# Patient Record
Sex: FEMALE | Race: WHITE
[De-identification: names, ages, dates, MRNs, and addresses within clinical notes are randomized per-mention and may not be internally consistent; named-entity substitution may affect disease eponyms.]

---

## 2022-10-21 PROBLEM — Z00.00 ENCOUNTER FOR PREVENTIVE HEALTH EXAMINATION: Status: ACTIVE | Noted: 2022-10-21

## 2022-10-31 RX ORDER — NORETHINDRONE ACETATE AND ETHINYL ESTRADIOL AND FERROUS FUMARATE 1MG-20(21)
1-20 KIT ORAL
Qty: 112 | Refills: 0 | Status: ACTIVE | COMMUNITY
Start: 2022-06-29

## 2022-10-31 RX ORDER — CYCLOBENZAPRINE HYDROCHLORIDE 5 MG/1
5 TABLET, FILM COATED ORAL
Qty: 60 | Refills: 0 | Status: ACTIVE | COMMUNITY
Start: 2022-04-01

## 2022-11-01 ENCOUNTER — APPOINTMENT (OUTPATIENT)
Dept: COLORECTAL SURGERY | Facility: CLINIC | Age: 22
End: 2022-11-01

## 2022-11-01 VITALS
OXYGEN SATURATION: 97 % | SYSTOLIC BLOOD PRESSURE: 143 MMHG | WEIGHT: 205 LBS | RESPIRATION RATE: 16 BRPM | BODY MASS INDEX: 27.77 KG/M2 | TEMPERATURE: 98.6 F | HEART RATE: 79 BPM | DIASTOLIC BLOOD PRESSURE: 82 MMHG | HEIGHT: 72 IN

## 2022-11-01 PROCEDURE — 46600 DIAGNOSTIC ANOSCOPY SPX: CPT

## 2022-11-01 PROCEDURE — 99204 OFFICE O/P NEW MOD 45 MIN: CPT | Mod: 25

## 2022-11-01 NOTE — ASSESSMENT
[FreeTextEntry1] : Exam findings and diagnosis were discussed at length with patient. \par Recommendations including increased fiber intake, adequate daily hydration, stool softeners as needed, and sitz baths as needed and after bowel movements were discussed.\par Medical management was discussed. She has been compliant with topical diltiazem compound.\par \par Concern for chronic fissure discussed and role of surgical intervention for chronic fissures refractory to medical management was discussed.\par Surgical options were discussed, including exam under anesthesia, fissurectomy and excision of anal tag, botox injection vs lateral partial internal sphincterotomy. Risk/benefits/alternatives discussed at length, including but not limited to pain, bleeding, recurrence of symptoms, need for future surgery, and risk of alteration of bowel habits, such as seepage, fecal urgency and/or fecal (gas, liquid or solid) incontinence discussed, which may be temporary or permanent.\par \par Recommend patient consider fissurectomy with botox injection.\par She declines further intervention at this time and requests alternative medical management.\par Prescription for nifedipine compound (she requests mail order) written for patient.\par Recommend reassessment in 1 month, or sooner as needed.\par \par All questions answered, patient expressed understanding and is agreeable to this plan.\par

## 2022-11-01 NOTE — PHYSICAL EXAM
[FreeTextEntry1] : Medical assistant present for duration of physical examination\par \par General no acute distress, alert and oriented\par Psych calm, pleasant demeanor, responding appropriately to questions\par Nonlabored breathing\par Ambulating without assistance\par Skin normal color and pigment, no visible lesions or rashes\par \par Anorectal Exam:\par Inspection no erythema, induration or fluctuance, no skin excoriation, anterior midline anal fissure with associated tag, posterior midline anal fissure\par RADHA mild tenderness, no masses palpated, no blood on gloved finger\par \par Procedure: Anoscopy\par \par Pre procedure Diagnosis: anal fissures\par Post procedure Diagnosis: anal fissures\par Anesthesia: none\par Estimated blood loss: none\par Specimen: none\par Complications: none\par \par Consent obtained. Anoscopy was performed by passing a lighted anoscope with lubricant jelly into the anal canal and the entire anal mucosal surface was inspected. Findings included anterior and posterior midline anal fissures with hypertrophied anal papilla anterior midline, mild internal hemorrhoids\par \par Patient tolerated examination and procedure well.\par \par \par

## 2022-11-01 NOTE — HISTORY OF PRESENT ILLNESS
[FreeTextEntry1] : 21 yo F presents for evaluation of anal fissure\par \par Denies PMH, prior smoker of vaping products\par Meds - Junel Fe birth control, cyclobenzaprine for TMJ\par \par PSH wisdom teeth. Not under anesthesia for procedure. \par Denies h/o pregnancy\par \par Never had a colonoscopy\par Denies FMH CRC or IBD\par \par c/o of anal fissure since June 2022 \par Pain with BMs, BRB on TP\par Pain previously only during BM, now lasting for about 30mins to 1hr after BM in past week. \par Seen by [facility] Dietrich early September at referral of PCP and started on diltiazem compound.\par She is using BID. \par BM 1-2/day. Denies diarrhea or constipation. Denies pushing or straining.\par Took miralax for a week after seeing Dr Dietrich, but did not make a difference and caused stomach aches so she stopped. Otherwise denies stool softeners or fiber supplement. \par \par Sexually active, denies anal intercourse. \par \par No ASA/NSAIDs in last 7 days

## 2022-11-29 ENCOUNTER — APPOINTMENT (OUTPATIENT)
Dept: COLORECTAL SURGERY | Facility: CLINIC | Age: 22
End: 2022-11-29

## 2022-11-29 VITALS
SYSTOLIC BLOOD PRESSURE: 136 MMHG | HEART RATE: 85 BPM | HEIGHT: 72 IN | BODY MASS INDEX: 27.77 KG/M2 | DIASTOLIC BLOOD PRESSURE: 86 MMHG | WEIGHT: 205 LBS | TEMPERATURE: 97.9 F

## 2022-11-29 PROCEDURE — 46600 DIAGNOSTIC ANOSCOPY SPX: CPT

## 2022-11-29 NOTE — HISTORY OF PRESENT ILLNESS
[FreeTextEntry1] : 23 yo F presents for f/u anal fissure\par \par Seen for initial consultation 11/1/22 c/o fissure since June 2022. 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 Pt opted to continue medical management w/ alternative compound. Prescription for topical nifedipine given.\par \par Pt presents for one month follow up\par Started the topical nifedipine, some improvement in first 2 weeks, then back to same level pain as prior.\par She feels her symptoms have plateaued. Pain with BM, blood on TP, pain lasted for 2-3 hrs after BMs last week. \par Daily BM, once per still. Took stool softeners for 1 week but did not noticed any difference in BM. Denies diarrhea or constipation.\par \par Never had a colonoscopy\par Denies FMH CRC or IBD\par \par Since last visit, started taking Baclofen for TMJ. Last took 3 days ago.

## 2022-11-29 NOTE — PHYSICAL EXAM
[FreeTextEntry1] : Medical assistant present for duration of physical examination\par \par General no acute distress, alert and oriented\par Psych calm, pleasant, in good spirits\par Nonlabored breathing\par Ambulating without assistance\par Skin no visible skin changes\par \par Anorectal Exam:\par Inspection no cellulitis, anterior midline anal fissure with associated tag, posterior midline anal fissure with small tag\par RADHA mild tenderness, no masses palpated, no blood on gloved finger\par \par Procedure: Anoscopy\par \par Pre procedure Diagnosis: anal fissures\par Post procedure Diagnosis: anal fissures\par Anesthesia: none\par Estimated blood loss: none\par Specimen: none\par Complications: none\par \par Consent obtained. Anoscopy was performed by passing a lighted anoscope with lubricant jelly into the anal canal and the entire anal mucosal surface was inspected. Findings included anterior and posterior midline anal fissures with hypertrophied anal papilla anterior midline, mild internal hemorrhoids\par \par Patient tolerated examination and procedure well.\par \par \par

## 2022-11-29 NOTE — ASSESSMENT
[FreeTextEntry1] : Symptoms have persisted and clinical exam unchanged despite medical management.\par Patient is agreeable to alternative treatment/intervention.\par Options discussed, recommend fissurectomy with botox injection.\par The nature of the procedure as well as risks and benefits were reviewed with the patient. Risk/benefits/alternatives discussed at length, including but not limited to pain, bleeding, infection, swelling, recurrence of symptoms, need for future surgery, scarring, and risk of alteration of bowel habits, such as seepage, fecal urgency and/or fecal (gas, liquid or solid) incontinence discussed, which may be temporary or permanent. Postprocedural expectations regarding pain, wound cosmesis, and wound healing was discussed.  The preoperative, intraoperative, and postoperative management were discussed with the patient in detail. All questions were answered, patient expressed understanding, and would like to proceed with the proposed surgery. Informed consent was obtained. Ambulatory surgery instructions were given to patient\par

## 2022-12-13 ENCOUNTER — TRANSCRIPTION ENCOUNTER (OUTPATIENT)
Age: 22
End: 2022-12-13

## 2023-01-03 ENCOUNTER — LABORATORY RESULT (OUTPATIENT)
Age: 23
End: 2023-01-03

## 2023-01-04 RX ORDER — ONABOTULINUMTOXINA 100 UNIT
100 VIAL (EA) INJECTION ONCE
Refills: 0 | Status: DISCONTINUED | OUTPATIENT
Start: 2023-01-06 | End: 2023-01-06

## 2023-01-05 ENCOUNTER — TRANSCRIPTION ENCOUNTER (OUTPATIENT)
Age: 23
End: 2023-01-05

## 2023-01-05 NOTE — ASU PATIENT PROFILE, ADULT - FALL HARM RISK - UNIVERSAL INTERVENTIONS
Bed in lowest position, wheels locked, appropriate side rails in place/Call bell, personal items and telephone in reach/Instruct patient to call for assistance before getting out of bed or chair/Non-slip footwear when patient is out of bed/[place] to call system/Physically safe environment - no spills, clutter or unnecessary equipment/Purposeful Proactive Rounding/Room/bathroom lighting operational, light cord in reach

## 2023-01-05 NOTE — ASU PATIENT PROFILE, ADULT - VISION (WITH CORRECTIVE LENSES IF THE PATIENT USUALLY WEARS THEM):
contact lens/glasses/Partially impaired: cannot see medication labels or newsprint, but can see obstacles in path, and the surrounding layout; can count fingers at arm's length

## 2023-01-05 NOTE — ASU PATIENT PROFILE, ADULT - NS PREOP UNDERSTANDS INFO
No solid food/dairy/candy/gum after midnight, water before 09:30am tomorrow. Pt to bring photo ID/Insurance card, dress in comfortable clothes; no jewelries/contact lens/valuables; no smoking/alcohol drinking/drug use; escort must have a photo ID. Address and phone number given/yes

## 2023-01-06 ENCOUNTER — TRANSCRIPTION ENCOUNTER (OUTPATIENT)
Age: 23
End: 2023-01-06

## 2023-01-06 ENCOUNTER — RESULT REVIEW (OUTPATIENT)
Age: 23
End: 2023-01-06

## 2023-01-06 ENCOUNTER — APPOINTMENT (OUTPATIENT)
Dept: COLORECTAL SURGERY | Facility: CLINIC | Age: 23
End: 2023-01-06

## 2023-01-06 ENCOUNTER — OUTPATIENT (OUTPATIENT)
Dept: OUTPATIENT SERVICES | Facility: HOSPITAL | Age: 23
LOS: 1 days | Discharge: HOME | End: 2023-01-06
Payer: COMMERCIAL

## 2023-01-06 VITALS
WEIGHT: 207.23 LBS | DIASTOLIC BLOOD PRESSURE: 85 MMHG | TEMPERATURE: 99 F | HEIGHT: 72 IN | SYSTOLIC BLOOD PRESSURE: 130 MMHG | OXYGEN SATURATION: 98 %

## 2023-01-06 VITALS
HEART RATE: 107 BPM | SYSTOLIC BLOOD PRESSURE: 143 MMHG | DIASTOLIC BLOOD PRESSURE: 73 MMHG | RESPIRATION RATE: 16 BRPM | TEMPERATURE: 100 F | OXYGEN SATURATION: 96 %

## 2023-01-06 DIAGNOSIS — Z98.890 OTHER SPECIFIED POSTPROCEDURAL STATES: Chronic | ICD-10-CM

## 2023-01-06 PROCEDURE — 88304 TISSUE EXAM BY PATHOLOGIST: CPT | Mod: 26

## 2023-01-06 PROCEDURE — 46505 CHEMODENERVATION ANAL MUSC: CPT | Mod: GC

## 2023-01-06 PROCEDURE — 46200 REMOVAL OF ANAL FISSURE: CPT | Mod: GC

## 2023-01-06 DEVICE — SURGICEL 4 X 8": Type: IMPLANTABLE DEVICE | Status: FUNCTIONAL

## 2023-01-06 RX ORDER — BACLOFEN 100 %
1 POWDER (GRAM) MISCELLANEOUS
Qty: 0 | Refills: 0 | DISCHARGE

## 2023-01-06 RX ORDER — SODIUM CHLORIDE 9 MG/ML
1000 INJECTION, SOLUTION INTRAVENOUS
Refills: 0 | Status: DISCONTINUED | OUTPATIENT
Start: 2023-01-06 | End: 2023-01-06

## 2023-01-06 RX ORDER — DESOGESTREL AND ETHINYL ESTRADIOL 0.15-0.03
1 KIT ORAL
Qty: 0 | Refills: 0 | DISCHARGE

## 2023-01-06 RX ORDER — OXYCODONE AND ACETAMINOPHEN 5; 325 MG/1; MG/1
1 TABLET ORAL
Qty: 20 | Refills: 0
Start: 2023-01-06

## 2023-01-06 RX ORDER — DOCUSATE SODIUM 100 MG
1 CAPSULE ORAL
Qty: 20 | Refills: 0
Start: 2023-01-06

## 2023-01-06 RX ORDER — OXYCODONE HYDROCHLORIDE 5 MG/1
5 TABLET ORAL ONCE
Refills: 0 | Status: DISCONTINUED | OUTPATIENT
Start: 2023-01-06 | End: 2023-01-06

## 2023-01-06 RX ADMIN — OXYCODONE HYDROCHLORIDE 5 MILLIGRAM(S): 5 TABLET ORAL at 14:03

## 2023-01-06 RX ADMIN — OXYCODONE HYDROCHLORIDE 5 MILLIGRAM(S): 5 TABLET ORAL at 13:33

## 2023-01-06 NOTE — ASU DISCHARGE PLAN (ADULT/PEDIATRIC) - CARE PROVIDER_API CALL
Bernarda Denton)  ColonRectal Surgery; Surgery  [address]  [place], [place] [address]  Phone: [phone]  Fax: [phone]  Follow Up Time: 1 month

## 2023-01-06 NOTE — BRIEF OPERATIVE NOTE - OPERATION/FINDINGS
Exam under anesthesia.   Chronic anal fissure with skin tag identified at anterior midline; fissure, tag, and surrounding mucosa excised, hemostasis achieved with electrocautery. Small mucosal tag excised at posterior midline, hemostasis achieved with electrocautery. 100 units botox injected into sphincter muscle.

## 2023-01-06 NOTE — ASU DISCHARGE PLAN (ADULT/PEDIATRIC) - NS MD DC FALL RISK RISK
For information on Fall & Injury Prevention, visit: [email or web address] OR  [email or web address] OR  https://www.cdc.gov/steadi/patient.html

## 2023-01-06 NOTE — ASU DISCHARGE PLAN (ADULT/PEDIATRIC) - SPECIFY DIET AND FLUID
mychart sent - pt should not be in need of this without being seen.   Asked what is he using on ;   Also reminder for annual to be scheduled High fiber diet

## 2023-01-06 NOTE — ASU DISCHARGE PLAN (ADULT/PEDIATRIC) - ASU DC SPECIAL INSTRUCTIONSFT
For pain, you may take 400mg of Ibuprofen every 6 hours as needed and 650mg of Tylenol every 6 hours as needed. You may alternate both medications, i.e. Ibuprofen at 12 PM, Tylenol at 3 PM, Ibuprofen 6 PM, Tylenol 9 PM, as needed.  For pain that is not resolved with over the counter medication, you may take 1 percocet 5mg-325mg tablet every 8 hours as needed. If you take Percocet, then do NOT take any Tylenol, as it already contains Tylenol.    Surgicell dressing will come out with next bowel movement - do not remove on your own.     Please follow instructions provided by Dr. Denton.    Follow up in 1 month.     General Discharge Instructions:  Please resume all regular home medications unless specifically advised not to take a particular medication. Please take any new medications as prescribed.  Please get plenty of rest, continue to ambulate several times per day, and drink adequate amounts of fluids. Avoid lifting weights greater than 5-10 lbs until you follow-up with your surgeon, who will instruct you further regarding activity restrictions.  Avoid driving or operating heavy machinery while taking pain medications.  Please follow-up with your surgeon and Primary Care Provider (PCP) as advised.    Warning Signs:  Please call your doctor or nurse practitioner if you experience the following:  *You experience new chest pain, pressure, squeezing or tightness.  *New or worsening cough, shortness of breath, or wheeze.  *If you are vomiting and cannot keep down fluids or your medications.  *You are getting dehydrated due to continued vomiting, diarrhea, or other reasons. Signs of dehydration include dry mouth, rapid heartbeat, or feeling dizzy or faint when standing.  *You see blood or dark/black material when you vomit or have a bowel movement.  *You experience burning when you urinate, have blood in your urine, or experience a discharge.  *Your pain is not improving within 8-12 hours or is not gone within 24 hours. Call or return immediately if your pain is getting worse, changes location, or moves to your chest or back.  *You have shaking chills, or fever greater than 101.5 degrees Fahrenheit or 38 degrees Celsius.  *Any change in your symptoms, or any new symptoms that concern you.

## 2023-01-06 NOTE — BRIEF OPERATIVE NOTE - NSICDXBRIEFPROCEDURE_GEN_ALL_CORE_FT
PROCEDURES:  Injection of Botox into anus 06-Jan-2023 12:54:22  Lillian Knox  Exam under anesthesia, rectum, with fissurectomy 06-Jan-2023 12:54:46  Lillian Knox

## 2023-01-10 PROBLEM — K60.2 ANAL FISSURE, UNSPECIFIED: Chronic | Status: ACTIVE | Noted: 2023-01-06

## 2023-01-18 LAB — SURGICAL PATHOLOGY STUDY: SIGNIFICANT CHANGE UP

## 2023-01-24 ENCOUNTER — APPOINTMENT (OUTPATIENT)
Dept: COLORECTAL SURGERY | Facility: CLINIC | Age: 23
End: 2023-01-24
Payer: COMMERCIAL

## 2023-01-24 VITALS
SYSTOLIC BLOOD PRESSURE: 113 MMHG | DIASTOLIC BLOOD PRESSURE: 74 MMHG | HEART RATE: 63 BPM | HEIGHT: 72 IN | TEMPERATURE: 98.7 F | BODY MASS INDEX: 27.77 KG/M2 | WEIGHT: 205 LBS

## 2023-01-24 PROCEDURE — 99024 POSTOP FOLLOW-UP VISIT: CPT

## 2023-01-24 NOTE — ASSESSMENT
[FreeTextEntry1] : Patient reassured\par Continue high fiber diet, adequate oral hydration, and sitz baths.\par Avoid constipation and straining\par Over the counter pain control prn\par Local wound care discussed. \par F/u in 1 month\par All questions were answered, patient expressed understanding, and is agreeable to this plan.\par 
Detail Level: Detailed
Detail Level: Generalized

## 2023-01-24 NOTE — HISTORY OF PRESENT ILLNESS
[FreeTextEntry1] : 21 y/o F presents for post operative follow up evaluation \par Known to GI Dr. Dietrich\par \par H/o anal fissure, s/p fissurectomy with botox injection 1/06/23. \par \par Surgical Pathology Report (1/6/23)\par \par Specimen(s) Submitted\par 1  Anterior midline anal Fissure\par 2  Posterior midline anal tag\par \par Final Diagnosis\par 1. Anterior midline anal fissure:\par - Squamous mucosa with marked inflammation and granulation tissue.\par \par 2. Posterior midline anal tag:\par - Polypoid fragment of squamous mucosa with reactive changes.\par Verified by: Shyann Funes M.D.\par \par Presents today for first postop visit. \par Took percocet first week of surgery, which controlled pain.\par She stopped taking pain killers after first week, not taking any OTC meds.\par Doing sitz baths.\par C/o mild pain in general, and pain during BMs since stopping pain meds.\par Moving BM 1-2x/day.\par Notes BRB on TP when she wiped after BM, stopped as of 2 days ago.\par No fevers.\par Some staining on underwear, like old blood, not using gauze.

## 2023-02-21 ENCOUNTER — APPOINTMENT (OUTPATIENT)
Dept: COLORECTAL SURGERY | Facility: CLINIC | Age: 23
End: 2023-02-21
Payer: COMMERCIAL

## 2023-02-21 VITALS
DIASTOLIC BLOOD PRESSURE: 52 MMHG | OXYGEN SATURATION: 98 % | HEIGHT: 72 IN | RESPIRATION RATE: 16 BRPM | BODY MASS INDEX: 27.77 KG/M2 | SYSTOLIC BLOOD PRESSURE: 117 MMHG | WEIGHT: 205 LBS | HEART RATE: 64 BPM | TEMPERATURE: 98.4 F

## 2023-02-21 PROCEDURE — 99024 POSTOP FOLLOW-UP VISIT: CPT

## 2023-02-21 NOTE — PHYSICAL EXAM
[FreeTextEntry1] : Medical assistant present for duration of physical examination\par \par General no acute distress, alert and oriented\par Psych calm, responding appropriately to questions.\par Nonlabored breathing\par Ambulating without assistance\par Skin no visible skin changes\par \par Anorectal Exam:\par Inspection no cellulitis, anterior midline fissurectomy site clean based and superficial appearing, posterior midline fissurectomy site appears healed\par RADHA nontender, no masses palpated, normal tone\par \par Procedure: Anoscopy\par \par Pre procedure Diagnosis: s/p fissurectomy and botox injection\par Post procedure Diagnosis: s/p fissurectomy and botox injection\par Anesthesia: none\par Estimated blood loss: none\par Specimen: none\par Complications: none\par \par Consent obtained. Anoscopy was performed by passing a lighted anoscope with lubricant jelly into the anal canal and the entire anal mucosal surface was inspected. Findings included anterior midline fissurectomy site clean based, mild internal hemorrhoids\par \par Patient tolerated examination and procedure well.\par \par \par \par  \par \par Patient tolerated examination well.\par \par \par

## 2023-02-21 NOTE — HISTORY OF PRESENT ILLNESS
[FreeTextEntry1] : 23 yo F presents for follow up\par \par h/o anal fissure, s/p fissurectomy with botox injection 1/06/23. \par Known to GI Dr. Dietrich\par \par Last seen 1/24/23 for first post op follow up, reported mild pain and some staining (likely old blood) on underwear. \par Exam noted anterior and posterior midline fissurectomy site clean w/ inflammation of the adjacent skin. Advised to continue local wound care as well as high fiber diet and fluid intake to avoid constipation/straining.\par \par Pt presents for one month follow up \par About 2-3 weeks ago stopped taking stool softeners.\par For past week has noticed increased in pain with BM and blood on TP, not in bowl, after BM. Occurring with every BM, stools are soft and formed. Daily BM, no pushing or straining. \par Started taking stool softeners again, noticed less pain and blood when on stool softener. \par Not using any cream or products to surgical area. \par \par No change in health.\par Started taking propanolol 20mg TID for anxiety.  \par \par No ASA or anticoagulation.\par Taking advil prn for pain.

## 2023-02-21 NOTE — ASSESSMENT
[FreeTextEntry1] : Resume stool softeners.\par Avoid constipation and straining.\par Can resume topical diltiazem after BMs.\par Continue supportive care.\par Reassess in 1 month.\par All questions were answered, patient expressed understanding, and is agreeable to this plan.\par

## 2023-03-09 ENCOUNTER — NON-APPOINTMENT (OUTPATIENT)
Age: 23
End: 2023-03-09

## 2023-03-09 RX ORDER — CIPROFLOXACIN HYDROCHLORIDE 500 MG/1
500 TABLET, FILM COATED ORAL TWICE DAILY
Qty: 10 | Refills: 0 | Status: ACTIVE | COMMUNITY
Start: 2023-03-09 | End: 1900-01-01

## 2023-03-09 RX ORDER — METRONIDAZOLE 500 MG/1
500 TABLET ORAL 3 TIMES DAILY
Qty: 15 | Refills: 0 | Status: ACTIVE | COMMUNITY
Start: 2023-03-09 | End: 1900-01-01

## 2023-04-04 ENCOUNTER — APPOINTMENT (OUTPATIENT)
Dept: COLORECTAL SURGERY | Facility: CLINIC | Age: 23
End: 2023-04-04
Payer: COMMERCIAL

## 2023-04-04 VITALS
HEIGHT: 72 IN | WEIGHT: 205 LBS | DIASTOLIC BLOOD PRESSURE: 80 MMHG | HEART RATE: 80 BPM | SYSTOLIC BLOOD PRESSURE: 122 MMHG | OXYGEN SATURATION: 95 % | BODY MASS INDEX: 27.77 KG/M2

## 2023-04-04 DIAGNOSIS — K60.2 ANAL FISSURE, UNSPECIFIED: ICD-10-CM

## 2023-04-04 PROCEDURE — 99024 POSTOP FOLLOW-UP VISIT: CPT

## 2023-04-04 NOTE — PHYSICAL EXAM
[FreeTextEntry1] : Medical assistant present for duration of physical examination\par \par General no acute distress, alert and oriented\par Psych in good spirits\par Nonlabored breathing\par Ambulating without assistance\par Skin no visible skin changes\par \par Anorectal Exam:\par Inspection no cellulitis, anterior midline fissurectomy site healed, no posterior midline fissure\par RADHA nontender, no masses palpated, normal tone\par \par Procedure: Anoscopy\par \par Pre procedure Diagnosis: h/o anal fissure, s/p fissurectomy and botox injection\par Post procedure Diagnosis: h/o anal fissure, s/p fissurectomy and botox injection\par Anesthesia: none\par Estimated blood loss: none\par Specimen: none\par Complications: none\par \par Consent obtained. Anoscopy was performed by passing a lighted anoscope with lubricant jelly into the anal canal and the entire anal mucosal surface was inspected. Findings included well healed fissurectomy sites, mild internal hemorrhoids, no discharge or drainage in anal canal\par \par Patient tolerated examination and procedure well.\par \par

## 2023-04-04 NOTE — HISTORY OF PRESENT ILLNESS
[FreeTextEntry1] : 23 yo F presents for follow up\par \par h/o anal fissure, s/p fissurectomy with botox injection 1/06/23. \par Known to GI Dr. Dietrich\par \par Last seen 2/21/2023 c/o pain with BM and blood on TP after BM, started taking stool softeners again.\par Exam noted anterior midline fissurectomy site superficial and posterior midline fissurectomy site appeared healed.\par Recommended continuing stool softeners and resuming topical compound cream. \par \par Patient called 3/9/23 c/o 1 week of anal discharge, noticed on TP with wiping, expressing concern for infection.\par Advised sitz baths and antibiotics prescribed (Cipro/Flagyl x 5 days)\par \par Returns today in follow up.\par Pt doing well, completed abx and discharge symptoms resolved. Denies anal pain, although sometimes has slight discomfort during BM that resolves upon completion of defecation. May have seen scant BRB once in the last 3 weeks. Continues Nifedipine once daily.\par \par BH 1-2 times daily, no issues.\par Denies constipation, straining or diarrhea\par Using stool softener daily and occasionally Miralax as needed\par Denies ASA use,  took Advil in the last week for headache

## 2023-04-04 NOTE — ASSESSMENT
[FreeTextEntry1] : Recovered well, patient reassured.\par Continue bowel regimen and supportive care.\par F/u as needed.\par All questions were answered, patient expressed understanding, and is agreeable to this plan.\par

## 2023-10-02 ENCOUNTER — APPOINTMENT (OUTPATIENT)
Dept: COLORECTAL SURGERY | Facility: CLINIC | Age: 23
End: 2023-10-02
Payer: COMMERCIAL

## 2023-10-02 VITALS
BODY MASS INDEX: 27.77 KG/M2 | HEIGHT: 72 IN | WEIGHT: 205 LBS | DIASTOLIC BLOOD PRESSURE: 89 MMHG | SYSTOLIC BLOOD PRESSURE: 139 MMHG | HEART RATE: 67 BPM | OXYGEN SATURATION: 99 %

## 2023-10-02 DIAGNOSIS — R15.1 FECAL SMEARING: ICD-10-CM

## 2023-10-02 PROCEDURE — 99214 OFFICE O/P EST MOD 30 MIN: CPT | Mod: 25

## 2023-10-02 PROCEDURE — 46600 DIAGNOSTIC ANOSCOPY SPX: CPT

## (undated) DEVICE — GLV 7 PROTEXIS (WHITE)

## (undated) DEVICE — WARMING BLANKET UPPER ADULT

## (undated) DEVICE — SUT VICRYL 2-0 27" SH

## (undated) DEVICE — PACK COLO RECTAL

## (undated) DEVICE — DRSG MASTISOL

## (undated) DEVICE — SUT VICRYL 3-0 18" SH UNDYED (POP-OFF)

## (undated) DEVICE — TAPE SILK 3"

## (undated) DEVICE — SLV COMPRESSION KNEE MED

## (undated) DEVICE — GLV 6.5 PROTEXIS (WHITE)

## (undated) DEVICE — BLADE SURGICAL #11 CARBON